# Patient Record
Sex: MALE | Race: WHITE | NOT HISPANIC OR LATINO | URBAN - NONMETROPOLITAN AREA
[De-identification: names, ages, dates, MRNs, and addresses within clinical notes are randomized per-mention and may not be internally consistent; named-entity substitution may affect disease eponyms.]

---

## 2024-06-21 ENCOUNTER — TELEPHONE (OUTPATIENT)
Dept: CARDIOLOGY | Facility: CLINIC | Age: 79
End: 2024-06-21
Payer: MEDICARE

## 2024-06-21 NOTE — TELEPHONE ENCOUNTER
1st attempt to schedule referral appointment. Left voicemail   Lorenzo Ford  Per Romelia: Schedule on 7/5/24 @ 10:00am with Dr. Jai Salazar MD.

## 2024-07-05 ENCOUNTER — APPOINTMENT (OUTPATIENT)
Dept: CARDIOLOGY | Facility: CLINIC | Age: 79
End: 2024-07-05
Payer: MEDICARE

## 2024-07-05 VITALS
SYSTOLIC BLOOD PRESSURE: 144 MMHG | WEIGHT: 164 LBS | DIASTOLIC BLOOD PRESSURE: 86 MMHG | BODY MASS INDEX: 22.21 KG/M2 | HEART RATE: 85 BPM | HEIGHT: 72 IN

## 2024-07-05 DIAGNOSIS — R60.0 LOCALIZED EDEMA: ICD-10-CM

## 2024-07-05 DIAGNOSIS — I50.1 CONGESTIVE HEART FAILURE, CHRONIC, LEFT-SIDED (MULTI): ICD-10-CM

## 2024-07-05 DIAGNOSIS — R06.02 SHORTNESS OF BREATH: ICD-10-CM

## 2024-07-05 DIAGNOSIS — F17.200 CURRENT SMOKER: ICD-10-CM

## 2024-07-05 DIAGNOSIS — I48.91 ATRIAL FIBRILLATION, UNSPECIFIED TYPE (MULTI): ICD-10-CM

## 2024-07-05 DIAGNOSIS — I50.9 CONGESTIVE HEART FAILURE, UNSPECIFIED HF CHRONICITY, UNSPECIFIED HEART FAILURE TYPE (MULTI): Primary | ICD-10-CM

## 2024-07-05 PROBLEM — I10 ESSENTIAL HYPERTENSION: Status: ACTIVE | Noted: 2024-07-05

## 2024-07-05 PROCEDURE — 99205 OFFICE O/P NEW HI 60 MIN: CPT | Performed by: INTERNAL MEDICINE

## 2024-07-05 PROCEDURE — 3079F DIAST BP 80-89 MM HG: CPT | Performed by: INTERNAL MEDICINE

## 2024-07-05 PROCEDURE — 93000 ELECTROCARDIOGRAM COMPLETE: CPT | Performed by: INTERNAL MEDICINE

## 2024-07-05 PROCEDURE — 1160F RVW MEDS BY RX/DR IN RCRD: CPT | Performed by: INTERNAL MEDICINE

## 2024-07-05 PROCEDURE — 3075F SYST BP GE 130 - 139MM HG: CPT | Performed by: INTERNAL MEDICINE

## 2024-07-05 PROCEDURE — 1159F MED LIST DOCD IN RCRD: CPT | Performed by: INTERNAL MEDICINE

## 2024-07-05 RX ORDER — FLUTICASONE FUROATE, UMECLIDINIUM BROMIDE AND VILANTEROL TRIFENATATE 100; 62.5; 25 UG/1; UG/1; UG/1
1 POWDER RESPIRATORY (INHALATION)
COMMUNITY
Start: 2024-05-15

## 2024-07-05 RX ORDER — SPIRONOLACTONE 25 MG/1
25 TABLET ORAL DAILY
Qty: 90 TABLET | Refills: 3 | Status: SHIPPED | OUTPATIENT
Start: 2024-07-05 | End: 2025-07-05

## 2024-07-05 RX ORDER — DILTIAZEM HYDROCHLORIDE 120 MG/1
120 CAPSULE, COATED, EXTENDED RELEASE ORAL DAILY
COMMUNITY
Start: 2024-05-23 | End: 2024-07-05 | Stop reason: ALTCHOICE

## 2024-07-05 ASSESSMENT — ENCOUNTER SYMPTOMS
LOSS OF BALANCE: 1
DYSPNEA ON EXERTION: 1

## 2024-07-05 NOTE — PROGRESS NOTES
Cardiology Consultation- New Consult    Reason for referral: Shortness of breath atrial fibrillation    HPI: Nicholas Glasgow is a 79 y.o. male longtime smoker has been evaluated recently for shortness of breath.  He had been seen by pulmonary with a diagnosis of COPD and was given inhaler therapy.  Recently had worsening shortness of breath and was in the emergency room.  During his evaluation in the emergency room he was noted to have an elevated BMP close to 1500 and elevated of his troponin.  The patient refused admission to the hospital.  The patient carries a diagnosis of paroxysmal atrial fibrillation but I do not have any EKG to confirm that.  His recent hospitalization he was in sinus rhythm with few PACs.  His EKG showed sinus rhythm with right bundle branch block and left anterior fascicular hemiblock.  Patient reported he was taking diltiazem and Eliquis but quit taking both recently.  He does describe mild orthopnea and lower extremity edema.  He had no previous cardiac workup.  The patient seems to be somehow stubborn and reluctant to follow medical advice.  He has not been taking his medication recently.    Assessment    1.  Shortness of breath appears to be multifactorial clearly the patient had a component of COPD but suspicion of heart failure has been raised based on recent evaluation in the emergency room with elevation of his BNP and clinical finding of lower extremity edema  2.  Reported paroxysmal atrial fibrillation currently in sinus rhythm was advised to be on Eliquis  3.  The patient is high risk for underlying ischemic heart disease  4.  Smoker and COPD  5.  Noncompliance with medical therapy patient was advised to be admitted to the hospital recently but declined  6.  Abnormal EKG showing right bundle branch block and left anterior fascicular hemiblock    Plan    1.  I advised the patient to restrict his salt intake  2.  I discussed the differential diagnosis and the possibility of ischemic  heart disease or heart failure with him  3.  I suggested resuming Eliquis 5 mg twice daily  4.  I advised him to start taking Aldactone 25 mg once daily  5.  I recommended proceeding with Lexiscan myocardial fusion study and echocardiogram  6 I advised the patient to go to the hospital if symptoms worsen  7.  Will try to retrieve EKG from Dr. De Leon's office to confirm previous atrial fibrillation  Past Medical History:   He has no past medical history on file.    Surgical History:   He has a past surgical history that includes Vascular surgery (Bilateral).    Family History:   Family History   Problem Relation Name Age of Onset    Alzheimer's disease Mother      Tuberculosis Father      Heart disease Brother         Social History:   Social History     Tobacco Use    Smoking status: Every Day     Current packs/day: 0.25     Types: Cigarettes    Smokeless tobacco: Never   Substance Use Topics    Alcohol use: Yes     Alcohol/week: 4.0 standard drinks of alcohol     Types: 4 Cans of beer per week        Allergies:  Penicillins     Current Medications:    Current Outpatient Medications:     apixaban (Eliquis) 5 mg tablet, Take 1 tablet (5 mg) by mouth twice a day., Disp: , Rfl:     Trelegy Ellipta 100-62.5-25 mcg blister with device, Inhale 1 puff once daily., Disp: , Rfl:      Vitals:  Vitals:    07/05/24 0928 07/05/24 0930   BP: 134/82 144/86   BP Location: Left arm Right arm   Patient Position: Sitting Sitting   Pulse: 85 85   Weight: 74.4 kg (164 lb)    Height: 1.829 m (6')       EKG done in office today      Review of Systems   Cardiovascular:  Positive for dyspnea on exertion and leg swelling.   Neurological:  Positive for loss of balance.   All other systems reviewed and are negative.      Objective         Physical Exam  Constitutional:       Appearance: Normal appearance.   HENT:      Nose: Nose normal.   Neck:      Vascular: No carotid bruit.   Cardiovascular:      Rate and Rhythm: Normal rate.      Pulses:  Normal pulses.      Heart sounds: Normal heart sounds.   Pulmonary:      Effort: Pulmonary effort is normal.   Abdominal:      General: Bowel sounds are normal.      Palpations: Abdomen is soft.   Musculoskeletal:         General: Normal range of motion.      Cervical back: Normal range of motion.      Right lower leg: Edema present.      Left lower leg: Edema present.      Comments: + 1 pitting edema   Skin:     General: Skin is warm and dry.   Neurological:      General: No focal deficit present.      Mental Status: He is alert.   Psychiatric:         Mood and Affect: Mood normal.         Behavior: Behavior normal.         Thought Content: Thought content normal.         Judgment: Judgment normal.                Assessment and Plan:   1. Atrial fibrillation, unspecified type (Multi)        2. Congestive heart failure, unspecified HF chronicity, unspecified heart failure type (Multi)        3. Localized edema        4. Shortness of breath        5. BMI 22.0-22.9, adult        6. Current smoker               Scribe Attestation  By signing my name below, Selena NESS LPN  , Scribe   attest that this documentation has been prepared under the direction and in the presence of Jai Salazar MD.    Provider Attestation - Scribe documentation    All medical record entries made by the Scribe were at my direction and personally dictated by me. I have reviewed the chart and agree that the record accurately reflects my personal performance of the history, physical exam, discussion and plan.

## 2024-07-05 NOTE — LETTER
July 5, 2024     Kenney Ventura DO  [address]  [place] [address]    Patient: Nicholas Glasgow   YOB: 1945   Date of Visit: 7/5/2024       Dear Dr. Kenney Ventura, DO:    Thank you for referring Nicholas Glasgow to me for evaluation. Below are my notes for this consultation.  If you have questions, please do not hesitate to call me. I look forward to following your patient along with you.       Sincerely,     Jai Salazar MD      CC: No Recipients  ______________________________________________________________________________________    Cardiology Consultation- New Consult    Reason for referral: Shortness of breath atrial fibrillation    HPI: Nicholas Glasgow is a 79 y.o. male longtime smoker has been evaluated recently for shortness of breath.  He had been seen by pulmonary with a diagnosis of COPD and was given inhaler therapy.  Recently had worsening shortness of breath and was in the emergency room.  During his evaluation in the emergency room he was noted to have an elevated BMP close to 1500 and elevated of his troponin.  The patient refused admission to the hospital.  The patient carries a diagnosis of paroxysmal atrial fibrillation but I do not have any EKG to confirm that.  His recent hospitalization he was in sinus rhythm with few PACs.  His EKG showed sinus rhythm with right bundle branch block and left anterior fascicular hemiblock.  Patient reported he was taking diltiazem and Eliquis but quit taking both recently.  He does describe mild orthopnea and lower extremity edema.  He had no previous cardiac workup.  The patient seems to be somehow stubborn and reluctant to follow medical advice.  He has not been taking his medication recently.    Assessment    1.  Shortness of breath appears to be multifactorial clearly the patient had a component of COPD but suspicion of heart failure has been raised based on recent evaluation in the emergency room with elevation of his BNP  and clinical finding of lower extremity edema  2.  Reported paroxysmal atrial fibrillation currently in sinus rhythm was advised to be on Eliquis  3.  The patient is high risk for underlying ischemic heart disease  4.  Smoker and COPD  5.  Noncompliance with medical therapy patient was advised to be admitted to the hospital recently but declined  6.  Abnormal EKG showing right bundle branch block and left anterior fascicular hemiblock    Plan    1.  I advised the patient to restrict his salt intake  2.  I discussed the differential diagnosis and the possibility of ischemic heart disease or heart failure with him  3.  I suggested resuming Eliquis 5 mg twice daily  4.  I advised him to start taking Aldactone 25 mg once daily  5.  I recommended proceeding with Lexiscan myocardial fusion study and echocardiogram  6 I advised the patient to go to the hospital if symptoms worsen  7.  Will try to retrieve EKG from Dr. De Leon's office to confirm previous atrial fibrillation  Past Medical History:   He has no past medical history on file.    Surgical History:   He has a past surgical history that includes Vascular surgery (Bilateral).    Family History:   Family History   Problem Relation Name Age of Onset   • Alzheimer's disease Mother     • Tuberculosis Father     • Heart disease Brother         Social History:   Social History     Tobacco Use   • Smoking status: Every Day     Current packs/day: 0.25     Types: Cigarettes   • Smokeless tobacco: Never   Substance Use Topics   • Alcohol use: Yes     Alcohol/week: 4.0 standard drinks of alcohol     Types: 4 Cans of beer per week        Allergies:  Penicillins     Current Medications:    Current Outpatient Medications:   •  apixaban (Eliquis) 5 mg tablet, Take 1 tablet (5 mg) by mouth twice a day., Disp: , Rfl:   •  Trelegy Ellipta 100-62.5-25 mcg blister with device, Inhale 1 puff once daily., Disp: , Rfl:      Vitals:  Vitals:    07/05/24 0928 07/05/24 0930   BP: 134/82  144/86   BP Location: Left arm Right arm   Patient Position: Sitting Sitting   Pulse: 85 85   Weight: 74.4 kg (164 lb)    Height: 1.829 m (6')       EKG done in office today      Review of Systems   Cardiovascular:  Positive for dyspnea on exertion and leg swelling.   Neurological:  Positive for loss of balance.   All other systems reviewed and are negative.      Objective        Physical Exam  Constitutional:       Appearance: Normal appearance.   HENT:      Nose: Nose normal.   Neck:      Vascular: No carotid bruit.   Cardiovascular:      Rate and Rhythm: Normal rate.      Pulses: Normal pulses.      Heart sounds: Normal heart sounds.   Pulmonary:      Effort: Pulmonary effort is normal.   Abdominal:      General: Bowel sounds are normal.      Palpations: Abdomen is soft.   Musculoskeletal:         General: Normal range of motion.      Cervical back: Normal range of motion.      Right lower leg: Edema present.      Left lower leg: Edema present.      Comments: + 1 pitting edema   Skin:     General: Skin is warm and dry.   Neurological:      General: No focal deficit present.      Mental Status: He is alert.   Psychiatric:         Mood and Affect: Mood normal.         Behavior: Behavior normal.         Thought Content: Thought content normal.         Judgment: Judgment normal.                Assessment and Plan:   1. Atrial fibrillation, unspecified type (Multi)        2. Congestive heart failure, unspecified HF chronicity, unspecified heart failure type (Multi)        3. Localized edema        4. Shortness of breath        5. BMI 22.0-22.9, adult        6. Current smoker               Scribe Attestation  By signing my name below, Selena NESS LPN, [place]   attest that this documentation has been prepared under the direction and in the presence of Jai Salazar MD.    Provider Attestation - Scribe documentation    All medical record entries made by the Scribe were at my direction and personally dictated by  me. I have reviewed the chart and agree that the record accurately reflects my personal performance of the history, physical exam, discussion and plan.

## 2024-07-11 ENCOUNTER — HOSPITAL ENCOUNTER (OUTPATIENT)
Dept: CARDIOLOGY | Facility: CLINIC | Age: 79
Discharge: HOME | End: 2024-07-11
Payer: MEDICARE

## 2024-07-11 VITALS
BODY MASS INDEX: 22.21 KG/M2 | WEIGHT: 164 LBS | DIASTOLIC BLOOD PRESSURE: 86 MMHG | SYSTOLIC BLOOD PRESSURE: 140 MMHG | HEIGHT: 72 IN

## 2024-07-11 DIAGNOSIS — I50.9 CONGESTIVE HEART FAILURE, UNSPECIFIED HF CHRONICITY, UNSPECIFIED HEART FAILURE TYPE (MULTI): ICD-10-CM

## 2024-07-11 DIAGNOSIS — R06.02 SHORTNESS OF BREATH: ICD-10-CM

## 2024-07-11 LAB
AORTIC VALVE MEAN GRADIENT: 3 MMHG
AORTIC VALVE PEAK VELOCITY: 1.34 M/S
AV PEAK GRADIENT: 7.2 MMHG
AVA (PEAK VEL): 2 CM2
AVA (VTI): 2.04 CM2
EJECTION FRACTION APICAL 4 CHAMBER: 33.5
EJECTION FRACTION: 28 %
LEFT VENTRICLE INTERNAL DIMENSION DIASTOLE: 6.49 CM (ref 3.5–6)
LEFT VENTRICULAR OUTFLOW TRACT DIAMETER: 2.3 CM
LV EJECTION FRACTION BIPLANE: 37 %
MITRAL VALVE E/A RATIO: 2.16
MITRAL VALVE E/E' RATIO: 19.6
RIGHT VENTRICLE PEAK SYSTOLIC PRESSURE: 48.4 MMHG

## 2024-07-11 PROCEDURE — 93306 TTE W/DOPPLER COMPLETE: CPT

## 2024-07-11 PROCEDURE — 93306 TTE W/DOPPLER COMPLETE: CPT | Performed by: INTERNAL MEDICINE

## 2024-07-12 ENCOUNTER — TELEPHONE (OUTPATIENT)
Dept: CARDIOLOGY | Facility: CLINIC | Age: 79
End: 2024-07-12
Payer: MEDICARE

## 2024-07-12 NOTE — TELEPHONE ENCOUNTER
Left voicemail for patient to return call.          ----- Message from Jai Salazar sent at 7/12/2024  9:49 AM EDT -----  Advise echo showed weak heart muscle which we expected.  Keep next office visit to review  ----- Message -----  From: [facility] - Cardiology Results In  Sent: 7/11/2024   7:17 PM EDT  To: Jai Salazar MD

## 2024-07-25 ENCOUNTER — HOSPITAL ENCOUNTER (OUTPATIENT)
Dept: RADIOLOGY | Facility: CLINIC | Age: 79
Discharge: HOME | End: 2024-07-25
Payer: MEDICARE

## 2024-07-25 ENCOUNTER — HOSPITAL ENCOUNTER (OUTPATIENT)
Dept: CARDIOLOGY | Facility: CLINIC | Age: 79
Discharge: HOME | End: 2024-07-25
Payer: MEDICARE

## 2024-07-25 VITALS — DIASTOLIC BLOOD PRESSURE: 96 MMHG | SYSTOLIC BLOOD PRESSURE: 158 MMHG | HEART RATE: 85 BPM

## 2024-07-25 DIAGNOSIS — I50.9 CONGESTIVE HEART FAILURE, UNSPECIFIED HF CHRONICITY, UNSPECIFIED HEART FAILURE TYPE (MULTI): ICD-10-CM

## 2024-07-25 DIAGNOSIS — I50.1 CONGESTIVE HEART FAILURE, CHRONIC, LEFT-SIDED (MULTI): ICD-10-CM

## 2024-07-25 PROCEDURE — 3430000001 HC RX 343 DIAGNOSTIC RADIOPHARMACEUTICALS: Performed by: INTERNAL MEDICINE

## 2024-07-25 PROCEDURE — A9502 TC99M TETROFOSMIN: HCPCS | Performed by: INTERNAL MEDICINE

## 2024-07-25 PROCEDURE — 78452 HT MUSCLE IMAGE SPECT MULT: CPT

## 2024-07-25 PROCEDURE — 93017 CV STRESS TEST TRACING ONLY: CPT

## 2024-07-25 PROCEDURE — 2500000004 HC RX 250 GENERAL PHARMACY W/ HCPCS (ALT 636 FOR OP/ED): Performed by: INTERNAL MEDICINE

## 2024-07-25 RX ORDER — REGADENOSON 0.08 MG/ML
0.4 INJECTION, SOLUTION INTRAVENOUS ONCE
Status: COMPLETED | OUTPATIENT
Start: 2024-07-25 | End: 2024-07-25

## 2024-07-26 ENCOUNTER — TELEPHONE (OUTPATIENT)
Dept: CARDIOLOGY | Facility: CLINIC | Age: 79
End: 2024-07-26
Payer: MEDICARE

## 2024-07-26 NOTE — TELEPHONE ENCOUNTER
Wife phones back to discuss results. Results given, she verbalized understanding. She states patient is continuing to deal with shortness of breath. It is worse in the morning and wife states patient is unable to do anything as he becomes short of breath with just taking a few steps. She voices concern that patient feels it could be related to his lungs. Encouraged her to reach out to patient's pulmonologist, she verbalized understanding. Instructed to go to ER should symptoms worsen.     She is inquiring if there is anything else that is recommended at this time as patient remains short of breath consistently. Please advise

## 2024-07-26 NOTE — TELEPHONE ENCOUNTER
----- Message from Jai Salazar sent at 7/26/2024  6:56 AM EDT -----  Advise stress test showed no evidence of coronary artery blockages but heart muscle weakness which we are aware of based on his previous echo  ----- Message -----  From: Interface, Radiology Results In  Sent: 7/25/2024   5:06 PM EDT  To: Jai Salazar MD

## 2024-07-29 NOTE — TELEPHONE ENCOUNTER
Wife phones back, she states patient is continuing to deal with shortness of breat that she feels is not getting any better. States he is short of breath at rest, while lying down and it comes and goes. Informed her it can be related to patient's heart failure, however she is inquiring requesting a sooner office visit or if any medications need to further be adjusted. Please advise.

## 2024-07-30 ENCOUNTER — OFFICE VISIT (OUTPATIENT)
Dept: CARDIOLOGY | Facility: CLINIC | Age: 79
End: 2024-07-30
Payer: MEDICARE

## 2024-07-30 VITALS
BODY MASS INDEX: 23.05 KG/M2 | HEIGHT: 72 IN | HEART RATE: 80 BPM | DIASTOLIC BLOOD PRESSURE: 82 MMHG | WEIGHT: 170.2 LBS | SYSTOLIC BLOOD PRESSURE: 138 MMHG

## 2024-07-30 DIAGNOSIS — R06.02 SHORTNESS OF BREATH: ICD-10-CM

## 2024-07-30 DIAGNOSIS — I27.20 PULMONARY HYPERTENSION (MULTI): ICD-10-CM

## 2024-07-30 DIAGNOSIS — F17.200 CURRENT SMOKER: ICD-10-CM

## 2024-07-30 DIAGNOSIS — R60.0 LOCALIZED EDEMA: ICD-10-CM

## 2024-07-30 DIAGNOSIS — I10 ESSENTIAL HYPERTENSION: ICD-10-CM

## 2024-07-30 DIAGNOSIS — I48.0 PAROXYSMAL ATRIAL FIBRILLATION (MULTI): ICD-10-CM

## 2024-07-30 DIAGNOSIS — I48.91 ATRIAL FIBRILLATION, UNSPECIFIED TYPE (MULTI): ICD-10-CM

## 2024-07-30 DIAGNOSIS — I50.22 CHRONIC SYSTOLIC HEART FAILURE (MULTI): Primary | ICD-10-CM

## 2024-07-30 DIAGNOSIS — I34.0 NONRHEUMATIC MITRAL VALVE REGURGITATION: ICD-10-CM

## 2024-07-30 PROBLEM — I50.9 CHF (CONGESTIVE HEART FAILURE) (MULTI): Status: RESOLVED | Noted: 2024-07-05 | Resolved: 2024-07-30

## 2024-07-30 PROCEDURE — 3075F SYST BP GE 130 - 139MM HG: CPT | Performed by: INTERNAL MEDICINE

## 2024-07-30 PROCEDURE — 4004F PT TOBACCO SCREEN RCVD TLK: CPT | Performed by: INTERNAL MEDICINE

## 2024-07-30 PROCEDURE — 3079F DIAST BP 80-89 MM HG: CPT | Performed by: INTERNAL MEDICINE

## 2024-07-30 PROCEDURE — 1160F RVW MEDS BY RX/DR IN RCRD: CPT | Performed by: INTERNAL MEDICINE

## 2024-07-30 PROCEDURE — 1159F MED LIST DOCD IN RCRD: CPT | Performed by: INTERNAL MEDICINE

## 2024-07-30 PROCEDURE — 99215 OFFICE O/P EST HI 40 MIN: CPT | Performed by: INTERNAL MEDICINE

## 2024-07-30 RX ORDER — FUROSEMIDE 40 MG/1
40 TABLET ORAL DAILY
Qty: 90 TABLET | Refills: 3 | Status: SHIPPED | OUTPATIENT
Start: 2024-07-30 | End: 2025-07-30

## 2024-07-30 ASSESSMENT — ENCOUNTER SYMPTOMS: SHORTNESS OF BREATH: 1

## 2024-07-30 NOTE — PATIENT INSTRUCTIONS
Please bring all medicines, vitamins, and herbal supplements with you when you come to the office.    Prescriptions will not be filled unless you are compliant with your follow up appointments or have a follow up appointment scheduled as per instruction of your physician. Refills should be requested at the time of your visit.

## 2024-07-30 NOTE — PROGRESS NOTES
Subjective   Nicholas Glasgow is a 79 y.o. male       Chief Complaint    Results          HPI   Patient is here for follow-up continue management for recent evaluation for increasing shortness of breath and clinical picture of heart failure.  His stress test showed no evidence of myocardial ischemia.  His echocardiogram showed severe LV systolic dysfunction with moderate to severe mitral regurgitation and moderate degree of pulmonary hypertension clinical picture most likely consistent with nonischemic cardiomyopathy.  The patient was placed initially on Eliquis and Aldactone.  He reports minor improvement but continues to be significantly short of breath.     Assessment     1.  Chronic systolic heart failure due to nonischemic cardiomyopathy  2.  Reported paroxysmal atrial fibrillation currently in sinus rhythm was advised to be on Eliquis  3.  The patient is high risk for underlying ischemic heart disease.  Recent stress test was negative I reviewed with the patient results of his recent diagnostic testing  4.  Smoker and COPD  5.  Noncompliance with medical therapy patient was advised to be admitted to the hospital recently but declined  6.  Abnormal EKG showing right bundle branch block and left anterior fascicular hemiblock  7.  Moderate-severe mitral regurgitation I suspect due to heart failure and secondary we need to monitor I expect to improve with improvement of heart failure  8.  Moderate degree of pulmonary hypertension due to heart failure     Plan     1.  I advised the patient to restrict his salt intake  2.  I reviewed with the patient his recent diagnostic testing  3.  I recommended to continue present medical regimen and add Lasix 40 mg daily and Entresto 1 tablet twice daily   4.  I advised him to buy a blood pressure machine and to monitor his blood pressure notify me if it is too low  5.  I advised him to repeat basic metabolic profile in few weeks  6 I advised the patient to come back in 6 weeks to  continue to optimize heart failure therapy and add beta-blocker and  7.  We also reviewed the issue related to prevention of sudden cardiac death s/p ICD the patient want to defer  Review of Systems   Respiratory:  Positive for shortness of breath.    All other systems reviewed and are negative.           Vitals:    07/30/24 1446   BP: 138/82   BP Location: Right arm   Patient Position: Sitting   Pulse: 80   Weight: 77.2 kg (170 lb 3.2 oz)   Height: 1.829 m (6')        Objective   Physical Exam  Constitutional:       Appearance: Normal appearance.   HENT:      Nose: Nose normal.   Neck:      Vascular: No carotid bruit.   Cardiovascular:      Rate and Rhythm: Normal rate.      Pulses: Normal pulses.      Heart sounds: Normal heart sounds.   Pulmonary:      Effort: Pulmonary effort is normal.   Abdominal:      General: Bowel sounds are normal.      Palpations: Abdomen is soft.   Musculoskeletal:         General: Normal range of motion.      Cervical back: Normal range of motion.      Right lower leg: No edema.      Left lower leg: No edema.   Skin:     General: Skin is warm and dry.   Neurological:      General: No focal deficit present.      Mental Status: He is alert.   Psychiatric:         Mood and Affect: Mood normal.         Behavior: Behavior normal.         Thought Content: Thought content normal.         Judgment: Judgment normal.         Allergies  Penicillins     Current Medications    Current Outpatient Medications:     apixaban (Eliquis) 5 mg tablet, Take 1 tablet (5 mg) by mouth 2 times a day., Disp: 180 tablet, Rfl: 3    spironolactone (Aldactone) 25 mg tablet, Take 1 tablet (25 mg) by mouth once daily., Disp: 90 tablet, Rfl: 3    Trelegy Ellipta 100-62.5-25 mcg blister with device, Inhale 1 puff once daily., Disp: , Rfl:     furosemide (Lasix) 40 mg tablet, Take 1 tablet (40 mg) by mouth once daily., Disp: 90 tablet, Rfl: 3    sacubitriL-valsartan (Entresto) 24-26 mg tablet, Take 1 tablet by mouth 2  times a day., Disp: 180 tablet, Rfl: 3                     Assessment/Plan   1. Chronic systolic heart failure (Multi)  furosemide (Lasix) 40 mg tablet    Basic Metabolic Panel    Basic Metabolic Panel      2. Atrial fibrillation, unspecified type (Multi)  Follow Up In Cardiology    Follow Up In Cardiology      3. Paroxysmal atrial fibrillation (Multi)  sacubitriL-valsartan (Entresto) 24-26 mg tablet    Basic Metabolic Panel    Basic Metabolic Panel      4. Essential hypertension  Basic Metabolic Panel    Basic Metabolic Panel      5. BMI 23.0-23.9, adult        6. Shortness of breath        7. Localized edema  furosemide (Lasix) 40 mg tablet      8. Current smoker                 Scribe Attestation  By signing my name below, I, Monica PEREZ LPN , Scribe   attest that this documentation has been prepared under the direction and in the presence of Jai Salazar MD.     Provider Attestation - Scribe documentation    All medical record entries made by the Scribe were at my direction and personally dictated by me. I have reviewed the chart and agree that the record accurately reflects my personal performance of the history, physical exam, discussion and plan.

## 2024-07-30 NOTE — LETTER
July 30, 2024     Kenney Ventura DO  [address]  [place] [address]    Patient: Nicholsa Glasgow   YOB: 1945   Date of Visit: 7/30/2024       Dear Dr. Kenney Ventura DO:    Thank you for referring Nicholas Glasgow to me for evaluation. Below are my notes for this consultation.  If you have questions, please do not hesitate to call me. I look forward to following your patient along with you.       Sincerely,     Jai Salazar MD      CC: No Recipients  ______________________________________________________________________________________    Subjective   Nicholas Glasgow is a 79 y.o. male       Chief Complaint    Results          HPI   Patient is here for follow-up continue management for recent evaluation for increasing shortness of breath and clinical picture of heart failure.  His stress test showed no evidence of myocardial ischemia.  His echocardiogram showed severe LV systolic dysfunction with moderate to severe mitral regurgitation and moderate degree of pulmonary hypertension clinical picture most likely consistent with nonischemic cardiomyopathy.  The patient was placed initially on Eliquis and Aldactone.  He reports minor improvement but continues to be significantly short of breath.     Assessment     1.  Chronic systolic heart failure due to nonischemic cardiomyopathy  2.  Reported paroxysmal atrial fibrillation currently in sinus rhythm was advised to be on Eliquis  3.  The patient is high risk for underlying ischemic heart disease.  Recent stress test was negative I reviewed with the patient results of his recent diagnostic testing  4.  Smoker and COPD  5.  Noncompliance with medical therapy patient was advised to be admitted to the hospital recently but declined  6.  Abnormal EKG showing right bundle branch block and left anterior fascicular hemiblock  7.  Moderate-severe mitral regurgitation I suspect due to heart failure and secondary we need to monitor I expect to  improve with improvement of heart failure  8.  Moderate degree of pulmonary hypertension due to heart failure     Plan     1.  I advised the patient to restrict his salt intake  2.  I reviewed with the patient his recent diagnostic testing  3.  I recommended to continue present medical regimen and add Lasix 40 mg daily and Entresto 1 tablet twice daily   4.  I advised him to buy a blood pressure machine and to monitor his blood pressure notify me if it is too low  5.  I advised him to repeat basic metabolic profile in few weeks  6 I advised the patient to come back in 6 weeks to continue to optimize heart failure therapy and add beta-blocker and  7.  We also reviewed the issue related to prevention of sudden cardiac death s/p ICD the patient want to defer  Review of Systems   Respiratory:  Positive for shortness of breath.    All other systems reviewed and are negative.           Vitals:    07/30/24 1446   BP: 138/82   BP Location: Right arm   Patient Position: Sitting   Pulse: 80   Weight: 77.2 kg (170 lb 3.2 oz)   Height: 1.829 m (6')        Objective   Physical Exam  Constitutional:       Appearance: Normal appearance.   HENT:      Nose: Nose normal.   Neck:      Vascular: No carotid bruit.   Cardiovascular:      Rate and Rhythm: Normal rate.      Pulses: Normal pulses.      Heart sounds: Normal heart sounds.   Pulmonary:      Effort: Pulmonary effort is normal.   Abdominal:      General: Bowel sounds are normal.      Palpations: Abdomen is soft.   Musculoskeletal:         General: Normal range of motion.      Cervical back: Normal range of motion.      Right lower leg: No edema.      Left lower leg: No edema.   Skin:     General: Skin is warm and dry.   Neurological:      General: No focal deficit present.      Mental Status: He is alert.   Psychiatric:         Mood and Affect: Mood normal.         Behavior: Behavior normal.         Thought Content: Thought content normal.         Judgment: Judgment normal.          Allergies  Penicillins     Current Medications    Current Outpatient Medications:   •  apixaban (Eliquis) 5 mg tablet, Take 1 tablet (5 mg) by mouth 2 times a day., Disp: 180 tablet, Rfl: 3  •  spironolactone (Aldactone) 25 mg tablet, Take 1 tablet (25 mg) by mouth once daily., Disp: 90 tablet, Rfl: 3  •  Trelegy Ellipta 100-62.5-25 mcg blister with device, Inhale 1 puff once daily., Disp: , Rfl:   •  furosemide (Lasix) 40 mg tablet, Take 1 tablet (40 mg) by mouth once daily., Disp: 90 tablet, Rfl: 3  •  sacubitriL-valsartan (Entresto) 24-26 mg tablet, Take 1 tablet by mouth 2 times a day., Disp: 180 tablet, Rfl: 3                     Assessment/Plan   1. Chronic systolic heart failure (Multi)  furosemide (Lasix) 40 mg tablet    Basic Metabolic Panel    Basic Metabolic Panel      2. Atrial fibrillation, unspecified type (Multi)  Follow Up In Cardiology    Follow Up In Cardiology      3. Paroxysmal atrial fibrillation (Multi)  sacubitriL-valsartan (Entresto) 24-26 mg tablet    Basic Metabolic Panel    Basic Metabolic Panel      4. Essential hypertension  Basic Metabolic Panel    Basic Metabolic Panel      5. BMI 23.0-23.9, adult        6. Shortness of breath        7. Localized edema  furosemide (Lasix) 40 mg tablet      8. Current smoker                 Scribe Attestation  By signing my name below, I, Thea York LPNibe   attest that this documentation has been prepared under the direction and in the presence of Jai Salazar MD.     Provider Attestation - Scribe documentation    All medical record entries made by the Scribe were at my direction and personally dictated by me. I have reviewed the chart and agree that the record accurately reflects my personal performance of the history, physical exam, discussion and plan.

## 2024-07-31 ENCOUNTER — APPOINTMENT (OUTPATIENT)
Dept: CARDIOLOGY | Facility: CLINIC | Age: 79
End: 2024-07-31
Payer: MEDICARE

## 2024-08-07 ENCOUNTER — TELEPHONE (OUTPATIENT)
Dept: CARDIOLOGY | Facility: CLINIC | Age: 79
End: 2024-08-07
Payer: MEDICARE

## 2024-08-07 NOTE — TELEPHONE ENCOUNTER
Wife phoned to discuss side effects of patients medication. Attempted to call back. Left VM.   To [facility] for follow-up.

## 2024-08-08 LAB
NON-UH HIE ANION GAP: 9.4 (ref 6–15)
NON-UH HIE BLOOD UREA NITROGEN: 26 MG/DL (ref 7–25)
NON-UH HIE CALCIUM: 8.9 MG/DL (ref 8.6–10.3)
NON-UH HIE CARBON DIOXIDE: 31.3 MMOL/L (ref 21–31)
NON-UH HIE CHLORIDE: 105 MMOL/L (ref 98–107)
NON-UH HIE CREATININE: 1.24 MG/DL (ref 0.7–1.3)
NON-UH HIE ESTIMATED GFR: 58.77
NON-UH HIE GLUCOSE: 94 MG/DL (ref 70–100)
NON-UH HIE POTASSIUM: 3.7 MMOL/L (ref 3.5–5.1)
NON-UH HIE SODIUM: 142 MMOL/L (ref 136–145)

## 2024-08-16 ENCOUNTER — APPOINTMENT (OUTPATIENT)
Dept: CARDIOLOGY | Facility: CLINIC | Age: 79
End: 2024-08-16
Payer: MEDICARE

## 2024-09-10 ENCOUNTER — APPOINTMENT (OUTPATIENT)
Dept: CARDIOLOGY | Facility: CLINIC | Age: 79
End: 2024-09-10
Payer: MEDICARE

## 2024-09-10 VITALS
DIASTOLIC BLOOD PRESSURE: 62 MMHG | HEART RATE: 62 BPM | SYSTOLIC BLOOD PRESSURE: 120 MMHG | HEIGHT: 72 IN | BODY MASS INDEX: 21.43 KG/M2 | WEIGHT: 158.2 LBS

## 2024-09-10 DIAGNOSIS — I48.0 PAROXYSMAL ATRIAL FIBRILLATION (MULTI): ICD-10-CM

## 2024-09-10 DIAGNOSIS — I27.20 PULMONARY HYPERTENSION (MULTI): ICD-10-CM

## 2024-09-10 DIAGNOSIS — I48.91 ATRIAL FIBRILLATION, UNSPECIFIED TYPE (MULTI): ICD-10-CM

## 2024-09-10 DIAGNOSIS — R06.02 SHORTNESS OF BREATH: ICD-10-CM

## 2024-09-10 DIAGNOSIS — R60.0 LOCALIZED EDEMA: ICD-10-CM

## 2024-09-10 DIAGNOSIS — I10 ESSENTIAL HYPERTENSION: ICD-10-CM

## 2024-09-10 DIAGNOSIS — F17.200 CURRENT SMOKER: ICD-10-CM

## 2024-09-10 DIAGNOSIS — I50.22 CHRONIC SYSTOLIC HEART FAILURE (MULTI): Primary | ICD-10-CM

## 2024-09-10 PROCEDURE — 3074F SYST BP LT 130 MM HG: CPT | Performed by: INTERNAL MEDICINE

## 2024-09-10 PROCEDURE — 3078F DIAST BP <80 MM HG: CPT | Performed by: INTERNAL MEDICINE

## 2024-09-10 PROCEDURE — 4004F PT TOBACCO SCREEN RCVD TLK: CPT | Performed by: INTERNAL MEDICINE

## 2024-09-10 PROCEDURE — 99214 OFFICE O/P EST MOD 30 MIN: CPT | Performed by: INTERNAL MEDICINE

## 2024-09-10 PROCEDURE — 1159F MED LIST DOCD IN RCRD: CPT | Performed by: INTERNAL MEDICINE

## 2024-09-10 PROCEDURE — 1160F RVW MEDS BY RX/DR IN RCRD: CPT | Performed by: INTERNAL MEDICINE

## 2024-09-10 RX ORDER — CARVEDILOL 3.12 MG/1
3.12 TABLET ORAL
Qty: 180 TABLET | Refills: 3 | Status: SHIPPED | OUTPATIENT
Start: 2024-09-10 | End: 2025-09-10

## 2024-09-10 NOTE — PATIENT INSTRUCTIONS
Please bring all medicines, vitamins, and herbal supplements with you when you come to the office.    Prescriptions will not be filled unless you are compliant with your follow up appointments or have a follow up appointment scheduled as per instruction of your physician. Refills should be requested at the time of your visit.     Start Coreg

## 2024-09-10 NOTE — LETTER
September 10, 2024     Kenney Ventura DO  [address]  [place] [address]    Patient: Nicholas Glasgow   YOB: 1945   Date of Visit: 9/10/2024       Dear Dr. Kenney Ventura DO:    Thank you for referring Nicholas Glasgow to me for evaluation. Below are my notes for this consultation.  If you have questions, please do not hesitate to call me. I look forward to following your patient along with you.       Sincerely,     Jai Salazar MD      CC: No Recipients  ______________________________________________________________________________________    Subjective   Nicholas Glasgow is a 79 y.o. male       Chief Complaint    Follow-up          HPI   Patient is here for follow-up and to management for chronic systolic heart failure due to nonischemic cardiomyopathy, paroxysmal atrial fibrillation, and documented history of noncompliance with medical advice.  Since last time I saw him and based on his cardiac workup he was started on heart failure therapy with dramatic improvement of his functional status.  She currently described functional class II.  He denies lightheadedness, dizziness or syncope.  He is tolerating treatment well.  He has no edema.  He denies orthopnea.    Assessment     1.  Chronic systolic heart failure due to nonischemic cardiomyopathy markedly improved with initiating guideline directed heart failure therapy  2.  Reported paroxysmal atrial fibrillation currently in sinus rhythm was advised to be on Eliquis.  Currently normal sinus rhythm with no recent recurrence  3.  Recent stress test was negative for myocardial ischemia  4.  Smoker and COPD  5.  A pattern of noncompliance with medical advice with expression of reluctance he to take medication for a long-term start Coreg 3.125 twice daily  6.  Abnormal EKG showing right bundle branch block and left anterior fascicular hemiblock  7.  Moderate-severe mitral regurgitation I suspect due to heart failure and secondary we need  to monitor I expect to improve with improvement of heart failure  8.  Moderate degree of pulmonary hypertension due to heart failure     Plan     1.  I advised the patient to restrict his salt intake  2.  I reviewed with the patient his recent diagnostic testing  3.  I reviewed his recent lab work with him  4.  I advised him to buy a blood pressure machine and to monitor his blood pressure notify me if it is too low  5.  I advised him to come back in 3-month we will repeat his EKG and echocardiogram  6.  We discussed AICD the patient is reluctant  Review of Systems   All other systems reviewed and are negative.           Vitals:    09/10/24 1252   BP: 120/62   BP Location: Left arm   Patient Position: Sitting   Pulse: 62   Weight: 71.8 kg (158 lb 3.2 oz)   Height: 1.829 m (6')        Objective   Physical Exam  Constitutional:       Appearance: Normal appearance.   HENT:      Nose: Nose normal.   Neck:      Vascular: No carotid bruit.   Cardiovascular:      Rate and Rhythm: Normal rate.      Pulses: Normal pulses.      Heart sounds: Normal heart sounds.   Pulmonary:      Effort: Pulmonary effort is normal.   Abdominal:      General: Bowel sounds are normal.      Palpations: Abdomen is soft.   Musculoskeletal:         General: Normal range of motion.      Cervical back: Normal range of motion.      Right lower leg: No edema.      Left lower leg: No edema.   Skin:     General: Skin is warm and dry.   Neurological:      General: No focal deficit present.      Mental Status: He is alert.   Psychiatric:         Mood and Affect: Mood normal.         Behavior: Behavior normal.         Thought Content: Thought content normal.         Judgment: Judgment normal.         Allergies  Penicillins     Current Medications    Current Outpatient Medications:   •  apixaban (Eliquis) 5 mg tablet, Take 1 tablet (5 mg) by mouth 2 times a day., Disp: 180 tablet, Rfl: 3  •  furosemide (Lasix) 40 mg tablet, Take 1 tablet (40 mg) by mouth  once daily., Disp: 90 tablet, Rfl: 3  •  sacubitriL-valsartan (Entresto) 24-26 mg tablet, Take 1 tablet by mouth 2 times a day., Disp: 180 tablet, Rfl: 3  •  spironolactone (Aldactone) 25 mg tablet, Take 1 tablet (25 mg) by mouth once daily., Disp: 90 tablet, Rfl: 3  •  Trelegy Ellipta 100-62.5-25 mcg blister with device, Inhale 1 puff once daily., Disp: , Rfl:   •  carvedilol (Coreg) 3.125 mg tablet, Take 1 tablet (3.125 mg) by mouth 2 times daily (morning and late afternoon)., Disp: 180 tablet, Rfl: 3                     Assessment/Plan   1. Chronic systolic heart failure (Multi)  Follow Up In Cardiology    carvedilol (Coreg) 3.125 mg tablet    Transthoracic Echo Complete      2. Atrial fibrillation, unspecified type (Multi)  Follow Up In Cardiology      3. Essential hypertension        4. Paroxysmal atrial fibrillation (Multi)        5. Pulmonary hypertension (Multi)        6. Shortness of breath        7. BMI 21.0-21.9, adult        8. Localized edema        9. Current smoker                 Scribe Attestation  By signing my name below, IMadelin LPN, Scribe   attest that this documentation has been prepared under the direction and in the presence of Jai Salazar MD.     Provider Attestation - Scribe documentation    All medical record entries made by the Scribe were at my direction and personally dictated by me. I have reviewed the chart and agree that the record accurately reflects my personal performance of the history, physical exam, discussion and plan.

## 2024-09-10 NOTE — PROGRESS NOTES
Subjective   Nicholas Glasgow is a 79 y.o. male       Chief Complaint    Follow-up          HPI   Patient is here for follow-up and to management for chronic systolic heart failure due to nonischemic cardiomyopathy, paroxysmal atrial fibrillation, and documented history of noncompliance with medical advice.  Since last time I saw him and based on his cardiac workup he was started on heart failure therapy with dramatic improvement of his functional status.  She currently described functional class II.  He denies lightheadedness, dizziness or syncope.  He is tolerating treatment well.  He has no edema.  He denies orthopnea.    Assessment     1.  Chronic systolic heart failure due to nonischemic cardiomyopathy markedly improved with initiating guideline directed heart failure therapy  2.  Reported paroxysmal atrial fibrillation currently in sinus rhythm was advised to be on Eliquis.  Currently normal sinus rhythm with no recent recurrence  3.  Recent stress test was negative for myocardial ischemia  4.  Smoker and COPD  5.  A pattern of noncompliance with medical advice with expression of reluctance he to take medication for a long-term start Coreg 3.125 twice daily  6.  Abnormal EKG showing right bundle branch block and left anterior fascicular hemiblock  7.  Moderate-severe mitral regurgitation I suspect due to heart failure and secondary we need to monitor I expect to improve with improvement of heart failure  8.  Moderate degree of pulmonary hypertension due to heart failure     Plan     1.  I advised the patient to restrict his salt intake  2.  I reviewed with the patient his recent diagnostic testing  3.  I reviewed his recent lab work with him  4.  I advised him to buy a blood pressure machine and to monitor his blood pressure notify me if it is too low  5.  I advised him to come back in 3-month we will repeat his EKG and echocardiogram  6.  We discussed AICD the patient is reluctant  Review of Systems   All other  systems reviewed and are negative.           Vitals:    09/10/24 1252   BP: 120/62   BP Location: Left arm   Patient Position: Sitting   Pulse: 62   Weight: 71.8 kg (158 lb 3.2 oz)   Height: 1.829 m (6')        Objective   Physical Exam  Constitutional:       Appearance: Normal appearance.   HENT:      Nose: Nose normal.   Neck:      Vascular: No carotid bruit.   Cardiovascular:      Rate and Rhythm: Normal rate.      Pulses: Normal pulses.      Heart sounds: Normal heart sounds.   Pulmonary:      Effort: Pulmonary effort is normal.   Abdominal:      General: Bowel sounds are normal.      Palpations: Abdomen is soft.   Musculoskeletal:         General: Normal range of motion.      Cervical back: Normal range of motion.      Right lower leg: No edema.      Left lower leg: No edema.   Skin:     General: Skin is warm and dry.   Neurological:      General: No focal deficit present.      Mental Status: He is alert.   Psychiatric:         Mood and Affect: Mood normal.         Behavior: Behavior normal.         Thought Content: Thought content normal.         Judgment: Judgment normal.         Allergies  Penicillins     Current Medications    Current Outpatient Medications:     apixaban (Eliquis) 5 mg tablet, Take 1 tablet (5 mg) by mouth 2 times a day., Disp: 180 tablet, Rfl: 3    furosemide (Lasix) 40 mg tablet, Take 1 tablet (40 mg) by mouth once daily., Disp: 90 tablet, Rfl: 3    sacubitriL-valsartan (Entresto) 24-26 mg tablet, Take 1 tablet by mouth 2 times a day., Disp: 180 tablet, Rfl: 3    spironolactone (Aldactone) 25 mg tablet, Take 1 tablet (25 mg) by mouth once daily., Disp: 90 tablet, Rfl: 3    Trelegy Ellipta 100-62.5-25 mcg blister with device, Inhale 1 puff once daily., Disp: , Rfl:     carvedilol (Coreg) 3.125 mg tablet, Take 1 tablet (3.125 mg) by mouth 2 times daily (morning and late afternoon)., Disp: 180 tablet, Rfl: 3                     Assessment/Plan   1. Chronic systolic heart failure (Multi)   Follow Up In Cardiology    carvedilol (Coreg) 3.125 mg tablet    Transthoracic Echo Complete      2. Atrial fibrillation, unspecified type (Multi)  Follow Up In Cardiology      3. Essential hypertension        4. Paroxysmal atrial fibrillation (Multi)        5. Pulmonary hypertension (Multi)        6. Shortness of breath        7. BMI 21.0-21.9, adult        8. Localized edema        9. Current smoker                 Scribe Attestation  By signing my name below, Madelin NESS LPN, Scribe   attest that this documentation has been prepared under the direction and in the presence of Jai Salazar MD.     Provider Attestation - Scribe documentation    All medical record entries made by the Scribe were at my direction and personally dictated by me. I have reviewed the chart and agree that the record accurately reflects my personal performance of the history, physical exam, discussion and plan.

## 2024-10-17 ENCOUNTER — TELEPHONE (OUTPATIENT)
Dept: CARDIOLOGY | Facility: CLINIC | Age: 79
End: 2024-10-17
Payer: MEDICARE

## 2024-10-17 NOTE — TELEPHONE ENCOUNTER
Patient's wife phoned to update office that patient had stopped taking Coreg on his own due to increased weakness. Medication list updated. POV 12/17/2024. Please advise.    To Dr. Jai Salazar MD

## 2024-11-09 PROCEDURE — 93306 TTE W/DOPPLER COMPLETE: CPT | Performed by: INTERNAL MEDICINE

## 2024-11-15 ENCOUNTER — APPOINTMENT (OUTPATIENT)
Dept: CARDIOLOGY | Facility: CLINIC | Age: 79
End: 2024-11-15
Payer: MEDICARE

## 2024-12-03 ENCOUNTER — APPOINTMENT (OUTPATIENT)
Dept: CARDIOLOGY | Facility: CLINIC | Age: 79
End: 2024-12-03
Payer: MEDICARE

## 2024-12-17 ENCOUNTER — APPOINTMENT (OUTPATIENT)
Dept: CARDIOLOGY | Facility: CLINIC | Age: 79
End: 2024-12-17
Payer: MEDICARE

## 2025-01-01 PROCEDURE — 93308 TTE F-UP OR LMTD: CPT | Performed by: INTERNAL MEDICINE
